# Patient Record
Sex: MALE | Race: WHITE | NOT HISPANIC OR LATINO | Employment: UNEMPLOYED | URBAN - METROPOLITAN AREA
[De-identification: names, ages, dates, MRNs, and addresses within clinical notes are randomized per-mention and may not be internally consistent; named-entity substitution may affect disease eponyms.]

---

## 2019-12-03 ENCOUNTER — HOSPITAL ENCOUNTER (EMERGENCY)
Facility: HOSPITAL | Age: 59
Discharge: HOME | End: 2019-12-03
Attending: EMERGENCY MEDICINE
Payer: MEDICAID

## 2019-12-03 VITALS
HEIGHT: 69 IN | DIASTOLIC BLOOD PRESSURE: 73 MMHG | HEART RATE: 75 BPM | TEMPERATURE: 99 F | SYSTOLIC BLOOD PRESSURE: 109 MMHG | RESPIRATION RATE: 18 BRPM | BODY MASS INDEX: 22.22 KG/M2 | OXYGEN SATURATION: 100 % | WEIGHT: 150 LBS

## 2019-12-03 DIAGNOSIS — S92.002A CLOSED FRACTURE OF BOTH CALCANEI, INITIAL ENCOUNTER: Primary | ICD-10-CM
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
## 2019-12-03 DIAGNOSIS — M79.89 LEG SWELLING: ICD-10-CM

## 2019-12-03 DIAGNOSIS — S92.001A CLOSED FRACTURE OF BOTH CALCANEI, INITIAL ENCOUNTER: Primary | ICD-10-CM

## 2019-12-03 LAB
ANION GAP SERPL CALC-SCNC: 10 MMOL/L (ref 8–16)
BASOPHILS # BLD AUTO: 0.01 K/UL (ref 0–0.2)
BASOPHILS NFR BLD: 0.2 % (ref 0–1.9)
BUN SERPL-MCNC: 17 MG/DL (ref 6–20)
CALCIUM SERPL-MCNC: 9.7 MG/DL (ref 8.7–10.5)
CHLORIDE SERPL-SCNC: 104 MMOL/L (ref 95–110)
CO2 SERPL-SCNC: 28 MMOL/L (ref 23–29)
CREAT SERPL-MCNC: 0.8 MG/DL (ref 0.5–1.4)
DIFFERENTIAL METHOD: ABNORMAL
EOSINOPHIL # BLD AUTO: 0.1 K/UL (ref 0–0.5)
EOSINOPHIL NFR BLD: 1.7 % (ref 0–8)
ERYTHROCYTE [DISTWIDTH] IN BLOOD BY AUTOMATED COUNT: 13.1 % (ref 11.5–14.5)
EST. GFR  (AFRICAN AMERICAN): >60 ML/MIN/1.73 M^2
EST. GFR  (NON AFRICAN AMERICAN): >60 ML/MIN/1.73 M^2
GLUCOSE SERPL-MCNC: 75 MG/DL (ref 70–110)
HCT VFR BLD AUTO: 40.5 % (ref 40–54)
HGB BLD-MCNC: 13.4 G/DL (ref 14–18)
LYMPHOCYTES # BLD AUTO: 2 K/UL (ref 1–4.8)
LYMPHOCYTES NFR BLD: 34.2 % (ref 18–48)
MCH RBC QN AUTO: 31 PG (ref 27–31)
MCHC RBC AUTO-ENTMCNC: 33.1 G/DL (ref 32–36)
MCV RBC AUTO: 94 FL (ref 82–98)
MONOCYTES # BLD AUTO: 0.5 K/UL (ref 0.3–1)
MONOCYTES NFR BLD: 7.9 % (ref 4–15)
NEUTROPHILS # BLD AUTO: 3.2 K/UL (ref 1.8–7.7)
NEUTROPHILS NFR BLD: 56 % (ref 38–73)
PLATELET # BLD AUTO: 169 K/UL (ref 150–350)
PMV BLD AUTO: 11.3 FL (ref 9.2–12.9)
POTASSIUM SERPL-SCNC: 4 MMOL/L (ref 3.5–5.1)
RBC # BLD AUTO: 4.32 M/UL (ref 4.6–6.2)
SODIUM SERPL-SCNC: 142 MMOL/L (ref 136–145)
WBC # BLD AUTO: 5.79 K/UL (ref 3.9–12.7)

## 2019-12-03 PROCEDURE — 25000003 PHARM REV CODE 250: Performed by: PHYSICIAN ASSISTANT

## 2019-12-03 PROCEDURE — 63600175 PHARM REV CODE 636 W HCPCS: Performed by: PHYSICIAN ASSISTANT

## 2019-12-03 PROCEDURE — 96376 TX/PRO/DX INJ SAME DRUG ADON: CPT | Mod: 59

## 2019-12-03 PROCEDURE — 29515 APPLICATION SHORT LEG SPLINT: CPT | Mod: RT

## 2019-12-03 PROCEDURE — 96374 THER/PROPH/DIAG INJ IV PUSH: CPT | Mod: 59

## 2019-12-03 PROCEDURE — 63600175 PHARM REV CODE 636 W HCPCS: Performed by: EMERGENCY MEDICINE

## 2019-12-03 PROCEDURE — 99285 EMERGENCY DEPT VISIT HI MDM: CPT | Mod: 25

## 2019-12-03 PROCEDURE — 85025 COMPLETE CBC W/AUTO DIFF WBC: CPT

## 2019-12-03 PROCEDURE — 80048 BASIC METABOLIC PNL TOTAL CA: CPT

## 2019-12-03 RX ORDER — OXYCODONE AND ACETAMINOPHEN 5; 325 MG/1; MG/1
1 TABLET ORAL EVERY 4 HOURS PRN
Qty: 18 TABLET | Refills: 0 | Status: SHIPPED | OUTPATIENT
Start: 2019-12-03

## 2019-12-03 RX ORDER — FENTANYL CITRATE 50 UG/ML
100 INJECTION, SOLUTION INTRAMUSCULAR; INTRAVENOUS
Status: DISCONTINUED | OUTPATIENT
Start: 2019-12-03 | End: 2019-12-03

## 2019-12-03 RX ORDER — MORPHINE SULFATE 4 MG/ML
4 INJECTION, SOLUTION INTRAMUSCULAR; INTRAVENOUS
Status: COMPLETED | OUTPATIENT
Start: 2019-12-03 | End: 2019-12-03

## 2019-12-03 RX ORDER — KETOROLAC TROMETHAMINE 10 MG/1
10 TABLET, FILM COATED ORAL
Status: COMPLETED | OUTPATIENT
Start: 2019-12-03 | End: 2019-12-03

## 2019-12-03 RX ORDER — NAPROXEN 500 MG/1
500 TABLET ORAL 2 TIMES DAILY WITH MEALS
Qty: 14 TABLET | Refills: 0 | Status: SHIPPED | OUTPATIENT
Start: 2019-12-03

## 2019-12-03 RX ADMIN — MORPHINE SULFATE 4 MG: 4 INJECTION INTRAVENOUS at 03:12

## 2019-12-03 RX ADMIN — KETOROLAC TROMETHAMINE 10 MG: 10 TABLET, FILM COATED ORAL at 02:12

## 2019-12-03 RX ADMIN — MORPHINE SULFATE 4 MG: 4 INJECTION INTRAVENOUS at 04:12

## 2019-12-03 NOTE — ED NOTES
Pt fell off scaffold of 1 story bldg 10 days ago.  Pt was wearing laced up work boots and reports he landed on concrete on bilateral feet.  C/o pain and swelling to bilateral feet, pain is worse to right heel/foot.  Swelling/bruising to right heel/foot with mild bruising to left foot.  Pt unable to bear weight on right foot.  Denies pain to bilateral lower legs, bilateral knees, bilateral hips.  No bruising/swelling noted to bilateral lower legs, knees or hips.  Denies lower back pain.  Denies LOC at time of incident.  Has been treating at home with ice packs, Aleve and ace wraps.  Denies taking any medications or any known allergies.  Sensation intact to bilateral feet/toes.  Pt able to flex feet up and down at ankle without difficulty bilaterally.  Pt able to move toes bilaterally without difficulty.  Pedal pulses intact bilaterally.

## 2019-12-03 NOTE — ED PROVIDER NOTES
Encounter Date: 12/3/2019    SCRIBE #1 NOTE: Johann GRAY, shira scribing for, and in the presence of, Leonie Buckley PA-C.       History     Chief Complaint   Patient presents with    Fall     pt fell off a scaffold 1 story off the ground about 1 week ago. Landed on both heels. C/o continued pain and bruising to both foot.     Ubaldo Fuller is a 59 y.o. male who  has no past medical history on file.    The patient presents to the ED due to foot pain and swelling s/p fall 12 days ago (11/21). Patient reports falling off a 1 story high off the ground scaffold 12 days ago and landed on both heels/feet. He has been unable to bear weight secondary to pain. The patient states he did not strike his head or lose consciousness but has had continued pain and bruising to his ankles/feet. He denies calf pain or any other injuries.    The history is provided by the patient.     Review of patient's allergies indicates:  No Known Allergies  History reviewed. No pertinent past medical history.  No past surgical history on file.  No family history on file.  Social History     Tobacco Use    Smoking status: Current Some Day Smoker   Substance Use Topics    Alcohol use: Not Currently    Drug use: Yes     Comment: heroin pta per pt     Review of Systems   Constitutional: Negative for fever.   HENT: Negative for sore throat.    Respiratory: Negative for shortness of breath.    Cardiovascular: Negative for chest pain and leg swelling.   Gastrointestinal: Negative for nausea.   Genitourinary: Negative for dysuria.   Musculoskeletal: Positive for arthralgias, joint swelling and myalgias. Negative for back pain.   Skin: Negative for rash.   Neurological: Negative for weakness.   Hematological: Does not bruise/bleed easily.   All other systems reviewed and are negative.      Physical Exam     Initial Vitals [12/03/19 1351]   BP Pulse Resp Temp SpO2   124/63 85 20 98.4 °F (36.9 °C) 98 %      MAP       --         Physical  Exam    Nursing note and vitals reviewed.  Constitutional: He appears well-developed and well-nourished. No distress.   HENT:   Head: Normocephalic and atraumatic.   Eyes: EOM are normal. Pupils are equal, round, and reactive to light.   Neck: Normal range of motion. Neck supple.   Cardiovascular: Normal rate and regular rhythm.   Pulses:       Dorsalis pedis pulses are 2+ on the right side, and 2+ on the left side.   Pulmonary/Chest: No respiratory distress.   Musculoskeletal: He exhibits edema and tenderness.        Right knee: Normal.        Left knee: Normal.        Right ankle: He exhibits swelling. Tenderness. Lateral malleolus and medial malleolus tenderness found.        Left ankle: He exhibits swelling. Tenderness. Lateral malleolus and medial malleolus tenderness found.        Right foot: There is decreased range of motion, tenderness, bony tenderness and swelling. There is normal capillary refill, no crepitus, no deformity and no laceration.        Left foot: There is decreased range of motion, tenderness and bony tenderness. There is no swelling, normal capillary refill, no crepitus, no deformity and no laceration.   Bilateral LE NVI.  Contusions noted descending from ankle to toes and base of feet bilaterally.  Diffuse TTP of bilateral ankles and feet   Neurological: He is alert and oriented to person, place, and time.   Skin: Skin is warm and dry. Capillary refill takes less than 2 seconds.         ED Course   Splint Application  Date/Time: 12/3/2019 8:47 PM  Performed by: Leonie Buckley PA-C  Authorized by: Solange Tapia MD   Consent Done: Yes  Consent: Verbal consent obtained.  Risks and benefits: risks, benefits and alternatives were discussed  Consent given by: patient  Location details: left ankle  Splint type: short leg  Supplies used: cotton padding  Post-procedure: The splinted body part was neurovascularly unchanged following the procedure.  Patient tolerance: Patient tolerated the  procedure well with no immediate complications  Comments: Placed by Maria D Ballard RN     Splint Application  Date/Time: 12/3/2019 8:48 PM  Performed by: Leonie Buckley PA-C  Authorized by: Solange Tapia MD   Location details: right ankle  Splint type: short leg  Supplies used: cotton padding  Post-procedure: The splinted body part was neurovascularly unchanged following the procedure.  Patient tolerance: Patient tolerated the procedure well with no immediate complications  Comments: Placed by Maria D Blake LPN         Labs Reviewed   CBC W/ AUTO DIFFERENTIAL - Abnormal; Notable for the following components:       Result Value    RBC 4.32 (*)     Hemoglobin 13.4 (*)     All other components within normal limits   BASIC METABOLIC PANEL          Imaging Results          CT Ankle (Including Hindfoot) Without Contrast Bilateral (Final result)  Result time 12/03/19 16:38:34   Procedure changed from CT Ankle (Including Hindfoot) Without Contrast Right     Final result by Dena Murillo MD (12/03/19 16:38:34)                 Impression:      Comminuted nondisplaced fracture of the bilateral calcaneal bone      Electronically signed by: Dena Murillo MD  Date:    12/03/2019  Time:    16:38             Narrative:    EXAMINATION:  CT ANKLE (INCLUDING HINDFOOT) WITHOUT CONTRAST BILATERAL    CLINICAL HISTORY:  calc fracture;    TECHNIQUE:  1.25 mm axial images of the calcaneal bone obtained, coronal and sagittal images reformatted.    COMPARISON:  None    FINDINGS:  Comminuted nondisplaced fracture right calcaneal bone.  The distal tibia, distal fibula, talus, navicular, cuboid, kidney forms, proximal metatarsal bones are intact.  The Achilles tendon appears to be intact.    Comminuted nondisplaced fracture left calcaneal bone.  The distal tibia, distal fibula appear normal.  The talus, cuboid, navicular, kidney forms and metatarsal bones are intact.  The Achilles tendon appear intact.                                US Lower Extremity Veins Bilateral (Final result)  Result time 12/03/19 16:18:36    Final result by Dena Murillo MD (12/03/19 16:18:36)                 Impression:      No evidence of deep venous thrombosis in either lower extremity.      Electronically signed by: Dena Murillo MD  Date:    12/03/2019  Time:    16:18             Narrative:    EXAMINATION:  US LOWER EXTREMITY VEINS BILATERAL    CLINICAL HISTORY:  Other specified soft tissue disorders    TECHNIQUE:  Duplex and color flow Doppler and dynamic compression was performed of the bilateral lower extremity veins was performed.    COMPARISON:  None    FINDINGS:  Right thigh veins: The common femoral, femoral, popliteal, upper greater saphenous, and deep femoral veins are patent and free of thrombus. The veins are normally compressible and have normal phasic flow and augmentation response.    Right calf veins: The visualized calf veins are patent.    Left thigh veins: The common femoral, femoral, popliteal, upper greater saphenous, and deep femoral veins are patent and free of thrombus. The veins are normally compressible and have normal phasic flow and augmentation response.    Left calf veins: The visualized calf veins are patent.    Miscellaneous: None                               X-Ray Lumbar Spine Ap And Lateral (Final result)  Result time 12/03/19 16:01:55    Final result by Anthony Rinaldi MD (12/03/19 16:01:55)                 Impression:      Mild degenerative change without evidence of fracture or malalignment.      Electronically signed by: Anthony Rinaldi MD  Date:    12/03/2019  Time:    16:01             Narrative:    EXAMINATION:  XR LUMBAR SPINE AP AND LATERAL    CLINICAL HISTORY:  T/L-spine trauma, minor-mod, low back pain;    TECHNIQUE:  AP, lateral and spot images were performed of the lumbar spine.    COMPARISON:  05/30/2004    FINDINGS:  Vertebral bodies are normal in height without evidence of  fracture.    Normal sagittal alignment is preserved.  No spondylolisthesis.    Degenerative endplate changes at multiple levels but the intervertebral disc heights are fairly well maintained.Hypertrophic facet arthropathy in the mid to lower lumbar spine.                                X-Ray Calcaneus Bilateral (Final result)  Result time 12/03/19 14:40:12    Final result by Anthony Rinaldi MD (12/03/19 14:40:12)                 Impression:      Bilateral calcaneal fracture as above.    This report was flagged in Epic as abnormal.      Electronically signed by: Anthony Rinaldi MD  Date:    12/03/2019  Time:    14:40             Narrative:    EXAMINATION:  XR FOOT COMPLETE 3 VIEW BILATERAL; XR CALCANEUS BILATERAL; XR ANKLE COMPLETE 3 VIEW BILATERAL    CLINICAL HISTORY:  fall;.    TECHNIQUE:  AP, lateral, and oblique views of the foot were performed.    AP oblique and lateral radiographs of both ankles.    Axial and lateral radiographs of the calcaneus bilaterally.    COMPARISON:  None    FINDINGS:  There are acute-appearing, mildly comminuted and displaced fractures of the calcaneus bilaterally, with probable intra-articular extension.    The remainder of the osseous structures appear intact.  No additional fracture identified elsewhere.  Mild degenerative change about the foot.  Prominent hallux valgus bilaterally.  Mild pes planus.    The ankle joint is maintained.  No significant joint space narrowing.  No evidence of dislocation.                                X-Ray Ankle Complete Bilateral (Final result)  Result time 12/03/19 14:40:12    Final result by Anthony Rinaldi MD (12/03/19 14:40:12)                 Impression:      Bilateral calcaneal fracture as above.    This report was flagged in Epic as abnormal.      Electronically signed by: Anthony Rinaldi MD  Date:    12/03/2019  Time:    14:40             Narrative:    EXAMINATION:  XR FOOT COMPLETE 3 VIEW BILATERAL; XR CALCANEUS BILATERAL; XR ANKLE COMPLETE  3 VIEW BILATERAL    CLINICAL HISTORY:  fall;.    TECHNIQUE:  AP, lateral, and oblique views of the foot were performed.    AP oblique and lateral radiographs of both ankles.    Axial and lateral radiographs of the calcaneus bilaterally.    COMPARISON:  None    FINDINGS:  There are acute-appearing, mildly comminuted and displaced fractures of the calcaneus bilaterally, with probable intra-articular extension.    The remainder of the osseous structures appear intact.  No additional fracture identified elsewhere.  Mild degenerative change about the foot.  Prominent hallux valgus bilaterally.  Mild pes planus.    The ankle joint is maintained.  No significant joint space narrowing.  No evidence of dislocation.                                X-Ray Foot Complete Bilateral (Final result)  Result time 12/03/19 14:40:12   Procedure changed from X-Ray Foot AP Bilateral     Final result by Anthony Rinaldi MD (12/03/19 14:40:12)                 Impression:      Bilateral calcaneal fracture as above.    This report was flagged in Epic as abnormal.      Electronically signed by: Anthony Rinaldi MD  Date:    12/03/2019  Time:    14:40             Narrative:    EXAMINATION:  XR FOOT COMPLETE 3 VIEW BILATERAL; XR CALCANEUS BILATERAL; XR ANKLE COMPLETE 3 VIEW BILATERAL    CLINICAL HISTORY:  fall;.    TECHNIQUE:  AP, lateral, and oblique views of the foot were performed.    AP oblique and lateral radiographs of both ankles.    Axial and lateral radiographs of the calcaneus bilaterally.    COMPARISON:  None    FINDINGS:  There are acute-appearing, mildly comminuted and displaced fractures of the calcaneus bilaterally, with probable intra-articular extension.    The remainder of the osseous structures appear intact.  No additional fracture identified elsewhere.  Mild degenerative change about the foot.  Prominent hallux valgus bilaterally.  Mild pes planus.    The ankle joint is maintained.  No significant joint space narrowing.  No  evidence of dislocation.                                 Medical Decision Making:   Initial Assessment:   Bilateral foot pain   Differential Diagnosis:   Fracture, dislocation, contusion   Clinical Tests:   Radiological Study: Ordered and Reviewed  ED Management:  Patient presents to ED for evaluation of bilateral foot pain after 10-12 foot fall 10 days ago.  NVI, swelling greater to right foot.  X-ray shows bilateral calcaneal fracture.  Ortho consulted and requested CT and Bulky Tenorio splint placement.  Will arrange f/u with [facility] ortho trauma.  Patient was discharged with wheel chair, anti-inflammatories and minimal narcotics.  Strict return precautions given and patient verbalized understanding.                  Attending Attestation:     Physician Attestation Statement for NP/PA:   I have conducted a face to face encounter with this patient in addition to the NP/PA, due to NP/PA Request    Other NP/PA Attestation Additions:    History of Present Illness:         Attached Notes              ED Provider Notes by Leonie Buckley PA-C at 12/3/2019  2:08 PM        Author: Leonie Buckley PA-C Service: Emergency Medicine Author Type: Physician Assistant   Filed: 12/3/2019  8:49 PM Note Type: ED Provider Notes Status: Cosign Needed   : Leonie Buckley PA-C (Physician Assistant) Cosign Required: Yes         Procedure Orders   1. Splint Application ([id number]) ordered by Leonie Buckley PA-C at 12/03/19 2047    2. Splint Application ([id number]) ordered by Leonie Buckley PA-C at 12/03/19 2048                       Encounter Date: 12/3/2019         SCRIBE #1 NOTE: Johann GRAY am scribing for, and in the presence of, Leonie Buckley PA-C.                 History                   Chief Complaint       Patient presents with            Fall                   pt fell off a scaffold 1 story off the ground about 1 week ago. Landed on both heels. C/o continued pain and bruising to both foot.            Ubaldo  Jonny is a 59 y.o. male who  has no past medical history on file.         The patient presents to the ED due to foot pain and swelling s/p fall 12 days ago (11/21). Patient reports falling off a 1 story high off the ground scaffold 12 days ago and landed on both heels/feet. He has been unable to bear weight secondary to pain. The patient states he did not strike his head or lose consciousness but has had continued pain and bruising to his ankles/feet. He denies calf pain or any other injuries.         The history is provided by the patient.          Review of patient's allergies indicates:    No Known Allergies    History reviewed. No pertinent past medical history.    No past surgical history on file.    No family history on file.      Social History                    Tobacco Use            Smoking status:     Current Some Day Smoker       Substance Use Topics            Alcohol use:     Not Currently            Drug use:     Yes                   Comment: heroin pta per pt            Review of Systems     Constitutional: Negative for fever.     HENT: Negative for sore throat.      Respiratory: Negative for shortness of breath.      Cardiovascular: Negative for chest pain and leg swelling.     Gastrointestinal: Negative for nausea.     Genitourinary: Negative for dysuria.     Musculoskeletal: Positive for arthralgias, joint swelling and myalgias. Negative for back pain.     Skin: Negative for rash.     Neurological: Negative for weakness.     Hematological: Does not bruise/bleed easily.     All other systems reviewed and are negative.                Physical Exam                     Initial Vitals (12/03/19 1351)       BP     Pulse     Resp     Temp     SpO2       124/63     85     20     98.4 °F (36.9 °C)     98 %               MAP                               --                                    Physical Exam         Nursing note and vitals reviewed.    Constitutional: He appears well-developed and  well-nourished. No distress.     HENT:     Head: Normocephalic and atraumatic.     Eyes: EOM are normal. Pupils are equal, round, and reactive to light.     Neck: Normal range of motion. Neck supple.     Cardiovascular: Normal rate and regular rhythm.     Pulses:         Dorsalis pedis pulses are 2+ on the right side, and 2+ on the left side.     Pulmonary/Chest: No respiratory distress.   Musculoskeletal: He exhibits edema and tenderness.        Right knee: Normal.        Left knee: Normal.        Right ankle: He exhibits swelling. Tenderness. Lateral malleolus and medial malleolus tenderness found.        Left ankle: He exhibits swelling. Tenderness. Lateral malleolus and medial malleolus tenderness found.        Right foot: There is decreased range of motion, tenderness, bony tenderness and swelling. There is normal capillary refill, no crepitus, no deformity and no laceration.        Left foot: There is decreased range of motion, tenderness and bony tenderness. There is no swelling, normal capillary refill, no crepitus, no deformity and no laceration.   Bilateral LE NVI.  Contusions noted descending from ankle to toes and base of feet bilaterally.  Diffuse TTP of bilateral ankles and feet   Neurological: He is alert and oriented to person, place, and time.     Skin: Skin is warm and dry. Capillary refill takes less than 2 seconds.                      ED Course       Splint Application  Date/Time: 12/3/2019 8:47 PM    Performed by: Leonie Buckley PA-C    Authorized by: Solange Tapia MD     Consent Done: Yes    Consent: Verbal consent obtained.  Risks and benefits: risks, benefits and alternatives were discussed  Consent given by: patient    Location details: left ankle  Splint type: short leg  Supplies used: cotton padding  Post-procedure: The splinted body part was neurovascularly unchanged following the procedure.  Patient tolerance: Patient tolerated the procedure well with no immediate  complications  Comments: Placed by Maria D Ballard RN     Splint Application  Date/Time: 12/3/2019 8:48 PM    Performed by: Leonie Buckley PA-C    Authorized by: Solange Tapia MD     Location details: right ankle  Splint type: short leg  Supplies used: cotton padding  Post-procedure: The splinted body part was neurovascularly unchanged following the procedure.  Patient tolerance: Patient tolerated the procedure well with no immediate complications  Comments: Placed by Maria D Blake LPN                      Labs Reviewed       CBC W/ AUTO DIFFERENTIAL - Abnormal; Notable for the following components:           Result     Value             RBC     4.32 (*)                   Hemoglobin     13.4 (*)                   All other components within normal limits       BASIC METABOLIC PANEL                           Imaging Results                                    CT Ankle (Including Hindfoot) Without Contrast Bilateral (Final result)      Result time 12/03/19 16:38:34       Procedure changed from CT Ankle (Including Hindfoot) Without Contrast Right                                   Final result by Dena Murillo MD (12/03/19 16:38:34)                                                                  Impression:                      Comminuted nondisplaced fracture of the bilateral calcaneal bone              Electronically signed by:       Dena Murillo MD    Date:                                                12/03/2019    Time:                                                16:38                                                Narrative:                 EXAMINATION:    CT ANKLE (INCLUDING HINDFOOT) WITHOUT CONTRAST BILATERAL         CLINICAL HISTORY:    calc fracture;         TECHNIQUE:    1.25 mm axial images of the calcaneal bone obtained, coronal and sagittal images reformatted.         COMPARISON:    None         FINDINGS:    Comminuted nondisplaced fracture right calcaneal bone.  The distal  tibia, distal fibula, talus, navicular, cuboid, kidney forms, proximal metatarsal bones are intact.  The Achilles tendon appears to be intact.         Comminuted nondisplaced fracture left calcaneal bone.  The distal tibia, distal fibula appear normal.  The talus, cuboid, navicular, kidney forms and metatarsal bones are intact.  The Achilles tendon appear intact.                                                                                                      US Lower Extremity Veins Bilateral (Final result)      Result time 12/03/19 16:18:36                                 Final result by Dena Murillo MD (12/03/19 16:18:36)                                                                  Impression:                      No evidence of deep venous thrombosis in either lower extremity.              Electronically signed by:       Dena Murillo MD    Date:                                                12/03/2019    Time:                                                16:18                                                Narrative:                 EXAMINATION:    US LOWER EXTREMITY VEINS BILATERAL         CLINICAL HISTORY:    Other specified soft tissue disorders         TECHNIQUE:    Duplex and color flow Doppler and dynamic compression was performed of the bilateral lower extremity veins was performed.         COMPARISON:    None         FINDINGS:    Right thigh veins: The common femoral, femoral, popliteal, upper greater saphenous, and deep femoral veins are patent and free of thrombus. The veins are normally compressible and have normal phasic flow and augmentation response.         Right calf veins: The visualized calf veins are patent.         Left thigh veins: The common femoral, femoral, popliteal, upper greater saphenous, and deep femoral veins are patent and free of thrombus. The veins are normally compressible and have normal phasic flow and augmentation response.         Left calf veins:  The visualized calf veins are patent.         Miscellaneous: None                                                                                                      X-Ray Lumbar Spine Ap And Lateral (Final result)      Result time 12/03/19 16:01:55                                 Final result by Anthony Rinaldi MD (12/03/19 16:01:55)                                                                  Impression:                      Mild degenerative change without evidence of fracture or malalignment.              Electronically signed by:       Anthony Rinaldi MD    Date:                                                12/03/2019    Time:                                                16:01                                                Narrative:                 EXAMINATION:    XR LUMBAR SPINE AP AND LATERAL         CLINICAL HISTORY:    T/L-spine trauma, minor-mod, low back pain;         TECHNIQUE:    AP, lateral and spot images were performed of the lumbar spine.         COMPARISON:    05/30/2004         FINDINGS:    Vertebral bodies are normal in height without evidence of fracture.         Normal sagittal alignment is preserved.  No spondylolisthesis.         Degenerative endplate changes at multiple levels but the intervertebral disc heights are fairly well maintained.Hypertrophic facet arthropathy in the mid to lower lumbar spine.                                                                                                      Contains abnormal data X-Ray Calcaneus Bilateral (Final result)      Result time 12/03/19 14:40:12                                 Final result by Anthony Rinaldi MD (12/03/19 14:40:12)                                                                  Impression:                      Bilateral calcaneal fracture as above.         This report was flagged in Epic as abnormal.              Electronically signed by:       Anthony Rinaldi MD    Date:                                                 12/03/2019    Time:                                                14:40                                                Narrative:                 EXAMINATION:    XR FOOT COMPLETE 3 VIEW BILATERAL; XR CALCANEUS BILATERAL; XR ANKLE COMPLETE 3 VIEW BILATERAL         CLINICAL HISTORY:    fall;.         TECHNIQUE:    AP, lateral, and oblique views of the foot were performed.         AP oblique and lateral radiographs of both ankles.         Axial and lateral radiographs of the calcaneus bilaterally.         COMPARISON:    None         FINDINGS:    There are acute-appearing, mildly comminuted and displaced fractures of the calcaneus bilaterally, with probable intra-articular extension.         The remainder of the osseous structures appear intact.  No additional fracture identified elsewhere.  Mild degenerative change about the foot.  Prominent hallux valgus bilaterally.  Mild pes planus.         The ankle joint is maintained.  No significant joint space narrowing.  No evidence of dislocation.                                                                                                      Contains abnormal data X-Ray Ankle Complete Bilateral (Final result)      Result time 12/03/19 14:40:12                                 Final result by Anthony Rinaldi MD (12/03/19 14:40:12)                                                                  Impression:                      Bilateral calcaneal fracture as above.         This report was flagged in Epic as abnormal.              Electronically signed by:       Anthony Rinaldi MD    Date:                                                12/03/2019    Time:                                                14:40                                                Narrative:                 EXAMINATION:    XR FOOT COMPLETE 3 VIEW BILATERAL; XR CALCANEUS BILATERAL; XR ANKLE COMPLETE 3 VIEW BILATERAL         CLINICAL HISTORY:    fall;.         TECHNIQUE:    AP, lateral, and  oblique views of the foot were performed.         AP oblique and lateral radiographs of both ankles.         Axial and lateral radiographs of the calcaneus bilaterally.         COMPARISON:    None         FINDINGS:    There are acute-appearing, mildly comminuted and displaced fractures of the calcaneus bilaterally, with probable intra-articular extension.         The remainder of the osseous structures appear intact.  No additional fracture identified elsewhere.  Mild degenerative change about the foot.  Prominent hallux valgus bilaterally.  Mild pes planus.         The ankle joint is maintained.  No significant joint space narrowing.  No evidence of dislocation.                                                                                               Contains abnormal data X-Ray Foot Complete Bilateral (Final result)      Result time 12/03/19 14:40:12       Procedure changed from X-Ray Foot AP Bilateral                                   Final result by Anthony Rinaldi MD (12/03/19 14:40:12)                                                                  Impression:                      Bilateral calcaneal fracture as above.         This report was flagged in Epic as abnormal.              Electronically signed by:       Anthony Rinaldi MD    Date:                                                12/03/2019    Time:                                                14:40                                                Narrative:                 EXAMINATION:    XR FOOT COMPLETE 3 VIEW BILATERAL; XR CALCANEUS BILATERAL; XR ANKLE COMPLETE 3 VIEW BILATERAL         CLINICAL HISTORY:    fall;.         TECHNIQUE:    AP, lateral, and oblique views of the foot were performed.         AP oblique and lateral radiographs of both ankles.         Axial and lateral radiographs of the calcaneus bilaterally.         COMPARISON:    None         FINDINGS:    There are acute-appearing, mildly comminuted and displaced fractures of the  calcaneus bilaterally, with probable intra-articular extension.         The remainder of the osseous structures appear intact.  No additional fracture identified elsewhere.  Mild degenerative change about the foot.  Prominent hallux valgus bilaterally.  Mild pes planus.         The ankle joint is maintained.  No significant joint space narrowing.  No evidence of dislocation.                                                                                       Medical Decision Making:     Initial Assessment:     Bilateral foot pain     Differential Diagnosis:     Fracture, dislocation, contusion     Clinical Tests:     Radiological Study: Ordered and Reviewed    ED Management:    Patient presents to ED for evaluation of bilateral foot pain after 10-12 foot fall 10 days ago.  NVI, swelling greater to right foot.  X-ray shows bilateral calcaneal fracture.  Ortho consulted and requested CT and Bulky Tenorio splint placement.  Will arrange f/u with [facility] ortho trauma.  Patient was discharged with wheel chair, anti-inflammatories and minimal narcotics.  Strict return precautions given and patient verbalized understanding.                                             ED Course as of Dec 03 2044       Tue Dec 03, 2019       1501     Orthopedic surgery consulted.  Spoke with Dr Marte who will review imaging and call back with recommendations.      (LD)       1539     BP: 124/63 (LD)       1539     Temp: 98.4 °F (36.9 °C) (LD)       1539     Pulse: 85 (LD)       1540     Resp: 20 (LD)       1540     SpO2: 98 % (LD)       1541     Patient is a 59-year-old Male with no significant past medical history who presents today complaining of bilateral foot and ankle pain after an injury approximately 12 days ago.  States that he fell off of 1 story scaffold landing on bilateral feet.  He has been unable to bear weight secondary to pain.    Physical Exam:  VSS, NAD, nontoxic appearing.  RRR, lungs CTAB.  There is diffuse swelling, TTP and  ecchymosis to lateral and medial malleoli bilaterally.  2+ DP and PT pulses bilaterally.  Sensation intact.  XR reveal bilateral calcaneal fractures.     Medical Decision Making: Orthopedic surgery consulted for further management.      Saw and evaluated patient in ED and recommended bilateral bulky maciel splints to lower extremities.  Also recommended discharge with follow up with [facility] orthopedic surgery      Procedure Note: Splint application by nurses                ED Course as of Dec 04 1007   Tue Dec 03, 2019   1501 Orthopedic surgery consulted.  Spoke with Dr Marte who will review imaging and call back with recommendations.    [LD]   1539 BP: 124/63 [LD]   1539 Temp: 98.4 °F (36.9 °C) [LD]   1539 Pulse: 85 [LD]   1540 Resp: 20 [LD]   1540 SpO2: 98 % [LD]   1541 Patient is a 59-year-old Male with no significant past medical history who presents today complaining of bilateral foot and ankle pain after an injury approximately 12 days ago.  States that he fell off of 1 story scaffold landing on bilateral feet.  He has been unable to bear weight secondary to pain.  VSS, NAD, nontoxic appearing.  RRR, lungs CTAB.  There is diffuse swelling, TTP and ecchymosis to lateral and medial malleoli bilaterally.  2+ DP and PT pulses bilaterally.  Sensation intact.  XR reveal bilateral calcaneal fractures.  Orthopedic surgery consulted for further management.     [LD]      ED Course User Index  [LD] Solange Tapia MD                Clinical Impression:       ICD-10-CM ICD-9-CM   1. Closed fracture of both calcanei, initial encounter S92.001A 825.0    S92.002A    2. Fall W19.XXXA E888.9   3. Leg swelling M79.89 729.81           Disposition:   Disposition: Discharged  Condition: Stable      ILeonie PA-C, personally performed the services described in this documentation. All medical record entries made by the scribe were at my direction and in my presence.  I have reviewed the chart and agree that the record  reflects my personal performance and is accurate and complete.                 Leonie Buckley PA-C  12/03/19 2049       Solange Tapia MD  12/04/19 1007

## 2021-10-02 ENCOUNTER — HOSPITAL ENCOUNTER (EMERGENCY)
Facility: HOSPITAL | Age: 61
Discharge: HOME | End: 2021-10-02
Attending: EMERGENCY MEDICINE
Payer: MEDICAID

## 2021-10-02 VITALS
HEART RATE: 79 BPM | OXYGEN SATURATION: 95 % | SYSTOLIC BLOOD PRESSURE: 114 MMHG | RESPIRATION RATE: 13 BRPM | WEIGHT: 135 LBS | BODY MASS INDEX: 19.94 KG/M2 | TEMPERATURE: 99 F | DIASTOLIC BLOOD PRESSURE: 58 MMHG

## 2021-10-02 DIAGNOSIS — R53.83 FATIGUE: ICD-10-CM

## 2021-10-02 DIAGNOSIS — K76.0 HEPATIC STEATOSIS: ICD-10-CM

## 2021-10-02 DIAGNOSIS — A08.4 VIRAL GASTROENTERITIS: Primary | ICD-10-CM

## 2021-10-02 DIAGNOSIS — K82.4 GALLBLADDER POLYP: ICD-10-CM

## 2021-10-02 DIAGNOSIS — E86.0 DEHYDRATION: ICD-10-CM

## 2021-10-02 LAB
ALBUMIN SERPL BCP-MCNC: 4 G/DL (ref 3.5–5.2)
ALP SERPL-CCNC: 110 U/L (ref 55–135)
ALT SERPL W/O P-5'-P-CCNC: 354 U/L (ref 10–44)
AMPHET+METHAMPHET UR QL: NEGATIVE
ANION GAP SERPL CALC-SCNC: 13 MMOL/L (ref 8–16)
AST SERPL-CCNC: 291 U/L (ref 10–40)
BARBITURATES UR QL SCN>200 NG/ML: NEGATIVE
BASOPHILS # BLD AUTO: 0.01 K/UL (ref 0–0.2)
BASOPHILS NFR BLD: 0.2 % (ref 0–1.9)
BENZODIAZ UR QL SCN>200 NG/ML: NEGATIVE
BILIRUB SERPL-MCNC: 2.2 MG/DL (ref 0.1–1)
BUN SERPL-MCNC: 24 MG/DL (ref 6–20)
BZE UR QL SCN: NEGATIVE
CALCIUM SERPL-MCNC: 9.4 MG/DL (ref 8.7–10.5)
CANNABINOIDS UR QL SCN: ABNORMAL
CHLORIDE SERPL-SCNC: 100 MMOL/L (ref 95–110)
CO2 SERPL-SCNC: 19 MMOL/L (ref 23–29)
CREAT SERPL-MCNC: 0.9 MG/DL (ref 0.5–1.4)
CREAT UR-MCNC: 251.3 MG/DL (ref 23–375)
CTP QC/QA: YES
DIFFERENTIAL METHOD: ABNORMAL
EOSINOPHIL # BLD AUTO: 0 K/UL (ref 0–0.5)
EOSINOPHIL NFR BLD: 0 % (ref 0–8)
ERYTHROCYTE [DISTWIDTH] IN BLOOD BY AUTOMATED COUNT: 12 % (ref 11.5–14.5)
EST. GFR  (AFRICAN AMERICAN): >60 ML/MIN/1.73 M^2
EST. GFR  (NON AFRICAN AMERICAN): >60 ML/MIN/1.73 M^2
ETHANOL SERPL-MCNC: <10 MG/DL
GLUCOSE SERPL-MCNC: 127 MG/DL (ref 70–110)
HCT VFR BLD AUTO: 44.4 % (ref 40–54)
HGB BLD-MCNC: 15 G/DL (ref 14–18)
IMM GRANULOCYTES # BLD AUTO: 0.02 K/UL (ref 0–0.04)
IMM GRANULOCYTES NFR BLD AUTO: 0.4 % (ref 0–0.5)
LIPASE SERPL-CCNC: 10 U/L (ref 4–60)
LYMPHOCYTES # BLD AUTO: 1.1 K/UL (ref 1–4.8)
LYMPHOCYTES NFR BLD: 21.8 % (ref 18–48)
MCH RBC QN AUTO: 30.7 PG (ref 27–31)
MCHC RBC AUTO-ENTMCNC: 33.8 G/DL (ref 32–36)
MCV RBC AUTO: 91 FL (ref 82–98)
METHADONE UR QL SCN>300 NG/ML: NEGATIVE
MONOCYTES # BLD AUTO: 0.4 K/UL (ref 0.3–1)
MONOCYTES NFR BLD: 8.6 % (ref 4–15)
NEUTROPHILS # BLD AUTO: 3.5 K/UL (ref 1.8–7.7)
NEUTROPHILS NFR BLD: 69 % (ref 38–73)
NRBC BLD-RTO: 0 /100 WBC
OPIATES UR QL SCN: NEGATIVE
PCP UR QL SCN>25 NG/ML: NEGATIVE
PLATELET # BLD AUTO: 115 K/UL (ref 150–450)
PMV BLD AUTO: 11.2 FL (ref 9.2–12.9)
POTASSIUM SERPL-SCNC: 3.8 MMOL/L (ref 3.5–5.1)
PROT SERPL-MCNC: 7.7 G/DL (ref 6–8.4)
RBC # BLD AUTO: 4.89 M/UL (ref 4.6–6.2)
SARS-COV-2 RDRP RESP QL NAA+PROBE: NEGATIVE
SODIUM SERPL-SCNC: 132 MMOL/L (ref 136–145)
TOXICOLOGY INFORMATION: ABNORMAL
WBC # BLD AUTO: 5.09 K/UL (ref 3.9–12.7)

## 2021-10-02 PROCEDURE — 96365 THER/PROPH/DIAG IV INF INIT: CPT

## 2021-10-02 PROCEDURE — 99285 EMERGENCY DEPT VISIT HI MDM: CPT | Mod: 25

## 2021-10-02 PROCEDURE — 85025 COMPLETE CBC W/AUTO DIFF WBC: CPT | Performed by: EMERGENCY MEDICINE

## 2021-10-02 PROCEDURE — U0002 COVID-19 LAB TEST NON-CDC: HCPCS | Performed by: EMERGENCY MEDICINE

## 2021-10-02 PROCEDURE — 83690 ASSAY OF LIPASE: CPT | Performed by: EMERGENCY MEDICINE

## 2021-10-02 PROCEDURE — 80307 DRUG TEST PRSMV CHEM ANLYZR: CPT | Performed by: EMERGENCY MEDICINE

## 2021-10-02 PROCEDURE — 93010 EKG 12-LEAD: ICD-10-PCS | Mod: ,,, | Performed by: INTERNAL MEDICINE

## 2021-10-02 PROCEDURE — 63600175 PHARM REV CODE 636 W HCPCS: Performed by: EMERGENCY MEDICINE

## 2021-10-02 PROCEDURE — 93010 ELECTROCARDIOGRAM REPORT: CPT | Mod: ,,, | Performed by: INTERNAL MEDICINE

## 2021-10-02 PROCEDURE — 25000003 PHARM REV CODE 250: Performed by: EMERGENCY MEDICINE

## 2021-10-02 PROCEDURE — 82077 ASSAY SPEC XCP UR&BREATH IA: CPT | Performed by: EMERGENCY MEDICINE

## 2021-10-02 PROCEDURE — 80053 COMPREHEN METABOLIC PANEL: CPT | Performed by: EMERGENCY MEDICINE

## 2021-10-02 PROCEDURE — 93005 ELECTROCARDIOGRAM TRACING: CPT

## 2021-10-02 RX ORDER — SODIUM CHLORIDE 9 MG/ML
INJECTION, SOLUTION INTRAVENOUS
Status: COMPLETED | OUTPATIENT
Start: 2021-10-02 | End: 2021-10-02

## 2021-10-02 RX ORDER — ONDANSETRON 4 MG/1
4 TABLET, ORALLY DISINTEGRATING ORAL EVERY 8 HOURS PRN
Qty: 12 TABLET | Refills: 0 | Status: SHIPPED | OUTPATIENT
Start: 2021-10-02 | End: 2021-10-05

## 2021-10-02 RX ADMIN — SODIUM CHLORIDE: 0.9 INJECTION, SOLUTION INTRAVENOUS at 03:10

## 2021-10-02 RX ADMIN — PROMETHAZINE HYDROCHLORIDE 12.5 MG: 25 INJECTION INTRAMUSCULAR; INTRAVENOUS at 03:10
